# Patient Record
Sex: FEMALE | Race: BLACK OR AFRICAN AMERICAN | NOT HISPANIC OR LATINO | URBAN - METROPOLITAN AREA
[De-identification: names, ages, dates, MRNs, and addresses within clinical notes are randomized per-mention and may not be internally consistent; named-entity substitution may affect disease eponyms.]

---

## 2020-01-02 ENCOUNTER — APPOINTMENT (RX ONLY)
Dept: URBAN - METROPOLITAN AREA CLINIC 1 | Facility: CLINIC | Age: 40
Setting detail: DERMATOLOGY
End: 2020-01-02

## 2020-01-02 DIAGNOSIS — B37.2 CANDIDIASIS OF SKIN AND NAIL: ICD-10-CM

## 2020-01-02 DIAGNOSIS — L42 PITYRIASIS ROSEA: ICD-10-CM

## 2020-01-02 PROCEDURE — ? COUNSELING

## 2020-01-02 PROCEDURE — ? PRESCRIPTION

## 2020-01-02 PROCEDURE — 99214 OFFICE O/P EST MOD 30 MIN: CPT

## 2020-01-02 PROCEDURE — ? TREATMENT REGIMEN

## 2020-01-02 PROCEDURE — ? ORDER TESTS

## 2020-01-02 PROCEDURE — ? ADDITIONAL NOTES

## 2020-01-02 RX ORDER — KETOCONAZOLE 20 MG/G
CREAM TOPICAL BID
Qty: 1 | Refills: 1 | Status: ERX | COMMUNITY
Start: 2020-01-02

## 2020-01-02 RX ORDER — MOMETASONE FUROATE 1 MG/G
OINTMENT TOPICAL BID
Qty: 1 | Refills: 2 | Status: ERX | COMMUNITY
Start: 2020-01-02

## 2020-01-02 RX ADMIN — KETOCONAZOLE: 20 CREAM TOPICAL at 00:00

## 2020-01-02 RX ADMIN — MOMETASONE FUROATE: 1 OINTMENT TOPICAL at 00:00

## 2020-01-02 ASSESSMENT — LOCATION SIMPLE DESCRIPTION DERM
LOCATION SIMPLE: LEFT UPPER BACK
LOCATION SIMPLE: UPPER BACK
LOCATION SIMPLE: ABDOMEN

## 2020-01-02 ASSESSMENT — LOCATION DETAILED DESCRIPTION DERM
LOCATION DETAILED: RIGHT RIB CAGE
LOCATION DETAILED: LEFT SUPERIOR UPPER BACK
LOCATION DETAILED: INFERIOR THORACIC SPINE
LOCATION DETAILED: LEFT RIB CAGE

## 2020-01-02 ASSESSMENT — LOCATION ZONE DERM: LOCATION ZONE: TRUNK

## 2020-01-02 NOTE — PROCEDURE: ADDITIONAL NOTES
Additional Notes: Differential dx is urticaria L50.0
Detail Level: Generalized
Additional Notes: Pt has had reoccurring rash since before 2013.  Pt had bx per Dr. Morley but came back normal. Hasn’t had bx in 10 years, scheduled an appt to see PA to get bx if rx doesn’t clear up rash.  Had physical with primary care 1 year ago and labs came back normal.  Saw allergist beginning 2019 and has allergy to dust mites.

## 2020-01-02 NOTE — PROCEDURE: TREATMENT REGIMEN
Continue Regimen: mometasone 0.1 % topical ointment Bid\\nSig: Apply to affected areas bid x4 weeks
Detail Level: Zone
Initiate Treatment: ketoconazole 2 % topical cream Bid\\nSig: Apply under breast folds bid x4 weeks

## 2022-03-28 ENCOUNTER — APPOINTMENT (RX ONLY)
Dept: URBAN - METROPOLITAN AREA CLINIC 33 | Facility: CLINIC | Age: 42
Setting detail: DERMATOLOGY
End: 2022-03-28

## 2022-03-28 DIAGNOSIS — L30.9 DERMATITIS, UNSPECIFIED: ICD-10-CM

## 2022-03-28 DIAGNOSIS — B37.2 CANDIDIASIS OF SKIN AND NAIL: ICD-10-CM

## 2022-03-28 PROCEDURE — ? TREATMENT REGIMEN

## 2022-03-28 PROCEDURE — 99213 OFFICE O/P EST LOW 20 MIN: CPT

## 2022-03-28 PROCEDURE — ? COUNSELING

## 2022-03-28 PROCEDURE — ? PRESCRIPTION

## 2022-03-28 PROCEDURE — ? ADDITIONAL NOTES

## 2022-03-28 RX ORDER — KETOCONAZOLE 20 MG/G
CREAM TOPICAL BID
Qty: 60 | Refills: 5 | Status: ERX | COMMUNITY
Start: 2022-03-28

## 2022-03-28 RX ADMIN — KETOCONAZOLE: 20 CREAM TOPICAL at 00:00

## 2022-03-28 ASSESSMENT — LOCATION DETAILED DESCRIPTION DERM
LOCATION DETAILED: LEFT BUTTOCK
LOCATION DETAILED: LEFT MEDIAL UPPER BACK
LOCATION DETAILED: LEFT LATERAL ABDOMEN

## 2022-03-28 ASSESSMENT — LOCATION SIMPLE DESCRIPTION DERM
LOCATION SIMPLE: LEFT BUTTOCK
LOCATION SIMPLE: ABDOMEN
LOCATION SIMPLE: LEFT UPPER BACK

## 2022-03-28 ASSESSMENT — LOCATION ZONE DERM: LOCATION ZONE: TRUNK

## 2022-03-28 NOTE — PROCEDURE: ADDITIONAL NOTES
Detail Level: Simple
Render Risk Assessment In Note?: no
Additional Notes: No acute rash. Evidence of post inflammatory hyperpigmentation. Old changes present on trunk and buttocks.

## 2022-04-04 ENCOUNTER — RX ONLY (OUTPATIENT)
Age: 42
Setting detail: RX ONLY
End: 2022-04-04

## 2022-04-04 RX ORDER — KETOCONAZOLE 20 MG/ML
SHAMPOO, SUSPENSION TOPICAL
Qty: 120 | Refills: 2 | Status: ERX | COMMUNITY
Start: 2022-04-04

## 2022-04-21 ENCOUNTER — APPOINTMENT (RX ONLY)
Dept: URBAN - METROPOLITAN AREA CLINIC 33 | Facility: CLINIC | Age: 42
Setting detail: DERMATOLOGY
End: 2022-04-21

## 2022-04-21 DIAGNOSIS — L42 PITYRIASIS ROSEA: ICD-10-CM | Status: INADEQUATELY CONTROLLED

## 2022-04-21 DIAGNOSIS — L21.8 OTHER SEBORRHEIC DERMATITIS: ICD-10-CM

## 2022-04-21 DIAGNOSIS — B37.2 CANDIDIASIS OF SKIN AND NAIL: ICD-10-CM

## 2022-04-21 PROBLEM — L30.9 DERMATITIS, UNSPECIFIED: Status: ACTIVE | Noted: 2022-04-21

## 2022-04-21 PROCEDURE — ? PRESCRIPTION MEDICATION MANAGEMENT

## 2022-04-21 PROCEDURE — ? ADDITIONAL NOTES

## 2022-04-21 PROCEDURE — ? PRESCRIPTION

## 2022-04-21 PROCEDURE — 99214 OFFICE O/P EST MOD 30 MIN: CPT

## 2022-04-21 PROCEDURE — ? COUNSELING

## 2022-04-21 RX ORDER — BETAMETHASONE DIPROPIONATE 0.5 MG/ML
LOTION TOPICAL
Qty: 60 | Refills: 3 | Status: ERX | COMMUNITY
Start: 2022-04-21

## 2022-04-21 RX ORDER — CICLOPIROX 10 MG/.96ML
SHAMPOO TOPICAL
Qty: 120 | Refills: 3 | Status: ERX | COMMUNITY
Start: 2022-04-21

## 2022-04-21 RX ORDER — MOMETASONE FUROATE 1 MG/G
OINTMENT TOPICAL
Qty: 45 | Refills: 2 | Status: ERX | COMMUNITY
Start: 2022-04-21

## 2022-04-21 RX ORDER — KETOCONAZOLE 20 MG/G
CREAM TOPICAL BID
Qty: 60 | Refills: 5 | Status: ERX

## 2022-04-21 RX ADMIN — MOMETASONE FUROATE: 1 OINTMENT TOPICAL at 00:00

## 2022-04-21 RX ADMIN — CICLOPIROX: 10 SHAMPOO TOPICAL at 00:00

## 2022-04-21 RX ADMIN — BETAMETHASONE DIPROPIONATE: 0.5 LOTION TOPICAL at 00:00

## 2022-04-21 ASSESSMENT — LOCATION DETAILED DESCRIPTION DERM
LOCATION DETAILED: LEFT RIB CAGE
LOCATION DETAILED: LEFT SUPERIOR UPPER BACK
LOCATION DETAILED: RIGHT RIB CAGE
LOCATION DETAILED: LOWER STERNUM

## 2022-04-21 ASSESSMENT — LOCATION ZONE DERM: LOCATION ZONE: TRUNK

## 2022-04-21 ASSESSMENT — LOCATION SIMPLE DESCRIPTION DERM
LOCATION SIMPLE: ABDOMEN
LOCATION SIMPLE: CHEST
LOCATION SIMPLE: LEFT UPPER BACK

## 2022-04-21 NOTE — HPI: RASH (SEBORRHEIC DERMATITIS)
Is This A New Presentation, Or A Follow-Up?: Rash
Additional History: Patient was treated for this previously and found that loprox shampoo and betamethaone helped keep her scalp quiet.

## 2022-04-21 NOTE — PROCEDURE: PRESCRIPTION MEDICATION MANAGEMENT
Detail Level: Zone
Continue Regimen: Apply ketoconazole daily \\nApply mometasone for two week prn bad flares
Render In Strict Bullet Format?: No
Continue Regimen: Apply mometasone to affected areas twice a day for up to two weeks

## 2022-04-21 NOTE — PROCEDURE: ADDITIONAL NOTES
Additional Notes: We recommend another biopsy today but patient declined against medical advice.
Detail Level: Generalized

## 2023-07-13 ENCOUNTER — APPOINTMENT (RX ONLY)
Dept: URBAN - METROPOLITAN AREA CLINIC 1 | Facility: CLINIC | Age: 43
Setting detail: DERMATOLOGY
End: 2023-07-13

## 2023-07-13 DIAGNOSIS — L21.8 OTHER SEBORRHEIC DERMATITIS: ICD-10-CM

## 2023-07-13 DIAGNOSIS — L42 PITYRIASIS ROSEA: ICD-10-CM

## 2023-07-13 DIAGNOSIS — B37.2 CANDIDIASIS OF SKIN AND NAIL: ICD-10-CM

## 2023-07-13 PROBLEM — L30.9 DERMATITIS, UNSPECIFIED: Status: ACTIVE | Noted: 2023-07-13

## 2023-07-13 PROCEDURE — ? COUNSELING

## 2023-07-13 PROCEDURE — ? PRESCRIPTION MEDICATION MANAGEMENT

## 2023-07-13 PROCEDURE — ? PRESCRIPTION

## 2023-07-13 PROCEDURE — 99214 OFFICE O/P EST MOD 30 MIN: CPT

## 2023-07-13 RX ORDER — CICLOPIROX 10 MG/.96ML
SHAMPOO TOPICAL
Qty: 120 | Refills: 3 | Status: ERX

## 2023-07-13 RX ORDER — MOMETASONE FUROATE 1 MG/G
OINTMENT TOPICAL
Qty: 45 | Refills: 3 | Status: ERX

## 2023-07-13 RX ORDER — KETOCONAZOLE 20 MG/G
CREAM TOPICAL BID
Qty: 60 | Refills: 4 | Status: ERX

## 2023-07-13 ASSESSMENT — LOCATION SIMPLE DESCRIPTION DERM
LOCATION SIMPLE: ABDOMEN
LOCATION SIMPLE: ANTERIOR SCALP
LOCATION SIMPLE: LEFT UPPER BACK

## 2023-07-13 ASSESSMENT — LOCATION DETAILED DESCRIPTION DERM
LOCATION DETAILED: SUBXIPHOID
LOCATION DETAILED: LEFT RIB CAGE
LOCATION DETAILED: LEFT SUPERIOR UPPER BACK
LOCATION DETAILED: MID-FRONTAL SCALP

## 2023-07-13 ASSESSMENT — LOCATION ZONE DERM
LOCATION ZONE: SCALP
LOCATION ZONE: TRUNK

## 2023-07-13 NOTE — PROCEDURE: PRESCRIPTION MEDICATION MANAGEMENT
Detail Level: Zone
Continue Regimen: Apply ketoconazole daily \\nApply mometasone for two week prn bad flares
Render In Strict Bullet Format?: No
Continue Regimen: Apply mometasone to affected areas twice a day for up to two weeks
Continue Regimen: ciclopirox 1 % shampoo: Wash scalp weekly\\n\\nbetamethasone dipropionate 0.05 % lotion: Apply to scalp nightly for flares